# Patient Record
Sex: MALE | Race: WHITE | NOT HISPANIC OR LATINO | URBAN - METROPOLITAN AREA
[De-identification: names, ages, dates, MRNs, and addresses within clinical notes are randomized per-mention and may not be internally consistent; named-entity substitution may affect disease eponyms.]

---

## 2020-02-28 ENCOUNTER — EMERGENCY (EMERGENCY)
Facility: HOSPITAL | Age: 20
LOS: 1 days | Discharge: HOME | End: 2020-02-28
Attending: EMERGENCY MEDICINE | Admitting: EMERGENCY MEDICINE
Payer: COMMERCIAL

## 2020-02-28 VITALS
RESPIRATION RATE: 18 BRPM | SYSTOLIC BLOOD PRESSURE: 135 MMHG | DIASTOLIC BLOOD PRESSURE: 76 MMHG | TEMPERATURE: 98 F | OXYGEN SATURATION: 100 % | HEART RATE: 60 BPM

## 2020-02-28 VITALS
HEART RATE: 79 BPM | RESPIRATION RATE: 16 BRPM | SYSTOLIC BLOOD PRESSURE: 127 MMHG | OXYGEN SATURATION: 99 % | DIASTOLIC BLOOD PRESSURE: 69 MMHG | TEMPERATURE: 98 F

## 2020-02-28 DIAGNOSIS — F41.1 GENERALIZED ANXIETY DISORDER: ICD-10-CM

## 2020-02-28 DIAGNOSIS — F32.9 MAJOR DEPRESSIVE DISORDER, SINGLE EPISODE, UNSPECIFIED: ICD-10-CM

## 2020-02-28 LAB
ALBUMIN SERPL ELPH-MCNC: 5.4 G/DL — HIGH (ref 3.3–5)
ALP SERPL-CCNC: 52 U/L — SIGNIFICANT CHANGE UP (ref 40–120)
ALT FLD-CCNC: 21 U/L — SIGNIFICANT CHANGE UP (ref 4–41)
AMPHET UR-MCNC: NEGATIVE — SIGNIFICANT CHANGE UP
ANION GAP SERPL CALC-SCNC: 12 MMO/L — SIGNIFICANT CHANGE UP (ref 7–14)
APAP SERPL-MCNC: < 15 UG/ML — LOW (ref 15–25)
APPEARANCE UR: SIGNIFICANT CHANGE UP
AST SERPL-CCNC: 21 U/L — SIGNIFICANT CHANGE UP (ref 4–40)
BACTERIA # UR AUTO: NEGATIVE — SIGNIFICANT CHANGE UP
BARBITURATES UR SCN-MCNC: NEGATIVE — SIGNIFICANT CHANGE UP
BASOPHILS # BLD AUTO: 0.04 K/UL — SIGNIFICANT CHANGE UP (ref 0–0.2)
BASOPHILS NFR BLD AUTO: 0.6 % — SIGNIFICANT CHANGE UP (ref 0–2)
BENZODIAZ UR-MCNC: NEGATIVE — SIGNIFICANT CHANGE UP
BILIRUB SERPL-MCNC: 0.9 MG/DL — SIGNIFICANT CHANGE UP (ref 0.2–1.2)
BILIRUB UR-MCNC: NEGATIVE — SIGNIFICANT CHANGE UP
BLOOD UR QL VISUAL: NEGATIVE — SIGNIFICANT CHANGE UP
BUN SERPL-MCNC: 11 MG/DL — SIGNIFICANT CHANGE UP (ref 7–23)
CALCIUM SERPL-MCNC: 10.2 MG/DL — SIGNIFICANT CHANGE UP (ref 8.4–10.5)
CANNABINOIDS UR-MCNC: POSITIVE — SIGNIFICANT CHANGE UP
CHLORIDE SERPL-SCNC: 99 MMOL/L — SIGNIFICANT CHANGE UP (ref 98–107)
CO2 SERPL-SCNC: 25 MMOL/L — SIGNIFICANT CHANGE UP (ref 22–31)
COCAINE METAB.OTHER UR-MCNC: NEGATIVE — SIGNIFICANT CHANGE UP
COLOR SPEC: YELLOW — SIGNIFICANT CHANGE UP
CREAT SERPL-MCNC: 0.82 MG/DL — SIGNIFICANT CHANGE UP (ref 0.5–1.3)
EOSINOPHIL # BLD AUTO: 0.07 K/UL — SIGNIFICANT CHANGE UP (ref 0–0.5)
EOSINOPHIL NFR BLD AUTO: 1.1 % — SIGNIFICANT CHANGE UP (ref 0–6)
ETHANOL BLD-MCNC: < 10 MG/DL — SIGNIFICANT CHANGE UP
GLUCOSE SERPL-MCNC: 86 MG/DL — SIGNIFICANT CHANGE UP (ref 70–99)
GLUCOSE UR-MCNC: NEGATIVE — SIGNIFICANT CHANGE UP
HCT VFR BLD CALC: 48.3 % — SIGNIFICANT CHANGE UP (ref 39–50)
HGB BLD-MCNC: 16.2 G/DL — SIGNIFICANT CHANGE UP (ref 13–17)
HYALINE CASTS # UR AUTO: NEGATIVE — SIGNIFICANT CHANGE UP
IMM GRANULOCYTES NFR BLD AUTO: 0.2 % — SIGNIFICANT CHANGE UP (ref 0–1.5)
KETONES UR-MCNC: NEGATIVE — SIGNIFICANT CHANGE UP
LEUKOCYTE ESTERASE UR-ACNC: NEGATIVE — SIGNIFICANT CHANGE UP
LYMPHOCYTES # BLD AUTO: 1.58 K/UL — SIGNIFICANT CHANGE UP (ref 1–3.3)
LYMPHOCYTES # BLD AUTO: 24.4 % — SIGNIFICANT CHANGE UP (ref 13–44)
MCHC RBC-ENTMCNC: 30.6 PG — SIGNIFICANT CHANGE UP (ref 27–34)
MCHC RBC-ENTMCNC: 33.5 % — SIGNIFICANT CHANGE UP (ref 32–36)
MCV RBC AUTO: 91.1 FL — SIGNIFICANT CHANGE UP (ref 80–100)
METHADONE UR-MCNC: NEGATIVE — SIGNIFICANT CHANGE UP
MONOCYTES # BLD AUTO: 0.55 K/UL — SIGNIFICANT CHANGE UP (ref 0–0.9)
MONOCYTES NFR BLD AUTO: 8.5 % — SIGNIFICANT CHANGE UP (ref 2–14)
NEUTROPHILS # BLD AUTO: 4.23 K/UL — SIGNIFICANT CHANGE UP (ref 1.8–7.4)
NEUTROPHILS NFR BLD AUTO: 65.2 % — SIGNIFICANT CHANGE UP (ref 43–77)
NITRITE UR-MCNC: NEGATIVE — SIGNIFICANT CHANGE UP
NRBC # FLD: 0 K/UL — SIGNIFICANT CHANGE UP (ref 0–0)
OPIATES UR-MCNC: NEGATIVE — SIGNIFICANT CHANGE UP
OXYCODONE UR-MCNC: NEGATIVE — SIGNIFICANT CHANGE UP
PCP UR-MCNC: NEGATIVE — SIGNIFICANT CHANGE UP
PH UR: 6 — SIGNIFICANT CHANGE UP (ref 5–8)
PLATELET # BLD AUTO: 271 K/UL — SIGNIFICANT CHANGE UP (ref 150–400)
PMV BLD: 10.6 FL — SIGNIFICANT CHANGE UP (ref 7–13)
POTASSIUM SERPL-MCNC: 4.3 MMOL/L — SIGNIFICANT CHANGE UP (ref 3.5–5.3)
POTASSIUM SERPL-SCNC: 4.3 MMOL/L — SIGNIFICANT CHANGE UP (ref 3.5–5.3)
PROT SERPL-MCNC: 8.6 G/DL — HIGH (ref 6–8.3)
PROT UR-MCNC: 30 — SIGNIFICANT CHANGE UP
RBC # BLD: 5.3 M/UL — SIGNIFICANT CHANGE UP (ref 4.2–5.8)
RBC # FLD: 12.9 % — SIGNIFICANT CHANGE UP (ref 10.3–14.5)
RBC CASTS # UR COMP ASSIST: HIGH (ref 0–?)
SALICYLATES SERPL-MCNC: < 5 MG/DL — LOW (ref 15–30)
SODIUM SERPL-SCNC: 136 MMOL/L — SIGNIFICANT CHANGE UP (ref 135–145)
SP GR SPEC: 1.03 — SIGNIFICANT CHANGE UP (ref 1–1.04)
SQUAMOUS # UR AUTO: SIGNIFICANT CHANGE UP
TSH SERPL-MCNC: 2.13 UIU/ML — SIGNIFICANT CHANGE UP (ref 0.27–4.2)
UROBILINOGEN FLD QL: NORMAL — SIGNIFICANT CHANGE UP
WBC # BLD: 6.48 K/UL — SIGNIFICANT CHANGE UP (ref 3.8–10.5)
WBC # FLD AUTO: 6.48 K/UL — SIGNIFICANT CHANGE UP (ref 3.8–10.5)
WBC UR QL: SIGNIFICANT CHANGE UP (ref 0–?)

## 2020-02-28 PROCEDURE — 99283 EMERGENCY DEPT VISIT LOW MDM: CPT

## 2020-02-28 PROCEDURE — 90792 PSYCH DIAG EVAL W/MED SRVCS: CPT | Mod: GC

## 2020-02-28 NOTE — ED PROVIDER NOTE - NSFOLLOWUPINSTRUCTIONS_ED_ALL_ED_FT
Follow up with therapist and NP at counseling center   REturn for worsening symptoms or new or concerning symptoms.

## 2020-02-28 NOTE — ED BEHAVIORAL HEALTH ASSESSMENT NOTE - DETAILS
see HPI zoloft - sexual side effects Sister - borderline personality disorder, multiple hospitalizations, SIB. Mother - depression, not treated spoke to mother

## 2020-02-28 NOTE — ED BEHAVIORAL HEALTH ASSESSMENT NOTE - RISK ASSESSMENT
Moderate Acute Suicide Risk Chronic risk factors include history of mood disorder, family history of psychiatric illness requiring hospitalization (sister with borderline personality disorder, SIB), history of prior SA (attempt to drown self 1 year ago, took 5 zoloft pills impulsively while intoxicated 3 weeks ago). Acute risk factors include mood episode, anhedonia, not attending school for the past weeks, substance use, anxiety, insomnia, recent changes in medication/treatment. Protective factors include no active SI, compliance with treatment, help-seeking behavior, good social support. Low Acute Suicide Risk

## 2020-02-28 NOTE — ED BEHAVIORAL HEALTH ASSESSMENT NOTE - SUICIDE RISK FACTORS
Recent onset of current/past psychiatric diagnosis/Alcohol/Substance abuse disorders/Agitation/Severe Anxiety/Panic/Current mood episode/Hopelessness or despair/Mood Disorder current/past/Family History of psychiatric diagnoses requiring hospitalization/Insomnia/Anhedonia

## 2020-02-28 NOTE — ED BEHAVIORAL HEALTH ASSESSMENT NOTE - SUMMARY
Pt is a 21yo, single, no dependents, domiciled with family, college student at [employer]. PPH depression, anxiety, no hospitalizations, suicidal gesture Feb 2020 (took 5 Zoloft while intoxicated), currently in outpatient treatment at [facility]. No violence or legal history. Daily cannabis use. No PMH. Pt was referred by therapist for worsening depression/anxiety with SI.    On assessment, pt is calm, cooperative, linear with no evidence of psychosis, mary jo or intoxication. Pt reports increasing depression and anxiety over the past few months despite starting and then adjusting medication with his outpatient provider. This has been affecting his sleep and functioning. Pt has not been attending class for the past 2 weeks due to social anxiety and amotivation, though he has continued to go to his part-time job. Pt expressed some ambivalence regarding suicidality, stating that he feels very depressed but denies current intent/plan, citing family as protective factor. Denies recent preparatory behaviors. States that several weeks ago, he took 5 pills of zoloft impulsively while intoxicated, but describes this as an attempt to "feel better" and did not have intent to die. Pt currently not an imminent risk to self but would benefit from inpatient hospitalization for stabilization. Pt is a 21yo, single, no dependents, domiciled with family, college student at [employer]. PPH depression, anxiety, no hospitalizations, suicidal gesture Feb 2020 (took 5 Zoloft while intoxicated), currently in outpatient treatment at [facility]. No violence or legal history. Daily cannabis use. No PMH. Pt was referred by therapist for worsening depression/anxiety with SI.    On assessment, pt is calm, cooperative, linear with no evidence of psychosis, mary jo or intoxication. Pt reports increasing depression and anxiety over the past few months despite starting and then adjusting medication with his outpatient provider. This has been affecting his sleep and functioning. Pt has not been attending class for the past 2 weeks due to social anxiety and amotivation, though he has continued to go to his part-time job. Pt expressed some ambivalence regarding suicidality, stating that he feels very depressed but denies current intent/plan, citing family as protective factor. Denies recent preparatory behaviors. States that several weeks ago, he took 5 pills of zoloft impulsively while intoxicated, but describes this as an attempt to "feel better" and did not have intent to die. Pt currently not an imminent risk to self, was offered voluntary psychiatric admission but pt refused, and does not meet involuntary commitment criteria at this time.

## 2020-02-28 NOTE — ED PROVIDER NOTE - PATIENT PORTAL LINK FT
You can access the FollowMyHealth Patient Portal offered by [facility] by registering at the following website: [email or web address]. By joining [employer]’s FollowMyHealth portal, you will also be able to view your health information using other applications (apps) compatible with our system.

## 2020-02-28 NOTE — ED BEHAVIORAL HEALTH ASSESSMENT NOTE - REFERRAL / APPOINTMENT DETAILS
F/u with outpatient therapist and NP at [facility] Pt has f/u appt with therapist at [facility] on 3/3/19 at 10:30am; mother is scheduled f/u appt with NP for this coming week

## 2020-02-28 NOTE — ED ADULT NURSE REASSESSMENT NOTE - NS ED NURSE REASSESS COMMENT FT1
Evaluated and cleared by psychiatry, MC by MD.  Pt calm denies s/i h/i/avh presently,  pt d/c by MD  d/c instructions and  resources provided , pt verbalizing understanding of d/c instructions.

## 2020-02-28 NOTE — ED BEHAVIORAL HEALTH ASSESSMENT NOTE - OTHER PAST PSYCHIATRIC HISTORY (INCLUDE DETAILS REGARDING ONSET, COURSE OF ILLNESS, INPATIENT/OUTPATIENT TREATMENT)
No hospitalizations; no h/o SA/SIB.  Has been in outpatient treatment for the past month. No hospitalizations; no h/o SA/SIB.  Has been in outpatient treatment for the past few months at [facility].

## 2020-02-28 NOTE — ED BEHAVIORAL HEALTH ASSESSMENT NOTE - SUICIDE PROTECTIVE FACTORS
Ability to cope with stress/Frustration tolerance/Identifies reasons for living/Engaged in work or school/Responsibility to family and others/Supportive social network of family or friends/Positive therapeutic relationships

## 2020-02-28 NOTE — ED BEHAVIORAL HEALTH ASSESSMENT NOTE - DESCRIPTION
Pt is calm, cooperative with no evidence of psychosis or mary jo.    Vital Signs Last 24 Hrs  T(C): 36.4 (28 Feb 2020 09:25), Max: 36.4 (28 Feb 2020 09:25)  T(F): 97.6 (28 Feb 2020 09:25), Max: 97.6 (28 Feb 2020 09:25)  HR: 79 (28 Feb 2020 09:25) (79 - 79)  BP: 127/69 (28 Feb 2020 09:25) (127/69 - 127/69)  BP(mean): --  RR: 16 (28 Feb 2020 09:25) (16 - 16)  SpO2: 99% (28 Feb 2020 09:25) (99% - 99%) None college student at [employer], living with family

## 2020-02-28 NOTE — ED BEHAVIORAL HEALTH ASSESSMENT NOTE - NS ED BHA PLAN TR BH CONTACTED FT
BRANNON for therapist and NP at [facility], but both were out of office today left messages for therapist and NP at [facility], but both were out of office today

## 2020-02-28 NOTE — ED ADULT TRIAGE NOTE - CHIEF COMPLAINT QUOTE
Endorses feeling depressed x 1 month, sleeping approximately 4 hours a night.  Denies HI.  Plan of taking "some pills".  Endorses access to same.  Pt endorses "when I was younger, I thought of drowning myself in the bathtub.

## 2020-02-28 NOTE — ED PROVIDER NOTE - OBJECTIVE STATEMENT
20 yr old c/o depression, SI, no evidence of self harm at this time. On sertraline 50 mg and celexa. patient eating well but not sleeping. Sees a nurse practitioner. No medical problems.  Hx of SI inpast, wanting to drown himself. Smokes, ocas marijuana and etoh

## 2020-02-28 NOTE — ED BEHAVIORAL HEALTH ASSESSMENT NOTE - VIOLENCE PROTECTIVE FACTORS:
Relationship stability/Engagement in treatment/Affective Stability/Insight into violence risk and need for management/treatment/Residential stability

## 2020-02-28 NOTE — ED BEHAVIORAL HEALTH ASSESSMENT NOTE - CASE SUMMARY
20M with a history of depression and anxiety, no prior psychiatric admissions, brought by mom on recommendation of therapist with concern for suicidal ideation. Patient endorses depressive and anxiety symptoms, amotivation, not attending school. He disclosed to his therapist that he took extra zoloft while intoxicated a few weeks ago which caused her to recommend he go to the hospital. Patient denies he is suicidal currently, has no intent or plan, does not want to be dead but expresses ambivalence and uncertainty about his future. He is otherwise not violent, manic or psychotic. He was offered voluntary admission due to his depressive/anxiety symptoms and below average functioning, however he declined, and given he is not currently suicidal or otherwise behaving in a dangerous manner, and is otherwise on meds and engaged in treatment, he is not presenting as an imminent threat of harm to self or others that would meet criteria for involuntary commitment. he is scheduled to follow up with his therapist on monday. patient fully participatory in safety planning.

## 2020-02-28 NOTE — ED BEHAVIORAL HEALTH ASSESSMENT NOTE - SAFETY PLAN DETAILS
Created and reviewed safety plan with patient Created and reviewed safety plan with patient. Advised to go to nearest ER if symptoms worsen or thoughts to harm self/others.

## 2020-02-28 NOTE — ED ADULT NURSE NOTE - OBJECTIVE STATEMENT
Received pt in  pt calm & cooperative c/o depression denies si/hi/avh presently safety & comfort measures maintained eval on going.